# Patient Record
Sex: FEMALE | Race: WHITE | NOT HISPANIC OR LATINO | Employment: UNEMPLOYED | URBAN - METROPOLITAN AREA
[De-identification: names, ages, dates, MRNs, and addresses within clinical notes are randomized per-mention and may not be internally consistent; named-entity substitution may affect disease eponyms.]

---

## 2017-12-02 ENCOUNTER — HOSPITAL ENCOUNTER (EMERGENCY)
Facility: HOSPITAL | Age: 27
Discharge: HOME | End: 2017-12-02
Payer: MEDICAID

## 2017-12-02 VITALS
TEMPERATURE: 99 F | HEART RATE: 111 BPM | HEIGHT: 71 IN | BODY MASS INDEX: 23.1 KG/M2 | DIASTOLIC BLOOD PRESSURE: 65 MMHG | WEIGHT: 165 LBS | OXYGEN SATURATION: 98 % | RESPIRATION RATE: 18 BRPM | SYSTOLIC BLOOD PRESSURE: 148 MMHG

## 2017-12-02 DIAGNOSIS — J32.9 SINUSITIS, UNSPECIFIED CHRONICITY, UNSPECIFIED LOCATION: Primary | ICD-10-CM

## 2017-12-02 LAB — B-HCG UR QL: NEGATIVE

## 2017-12-02 PROCEDURE — 63600175 PHARM REV CODE 636 W HCPCS: Performed by: NURSE PRACTITIONER

## 2017-12-02 PROCEDURE — 81025 URINE PREGNANCY TEST: CPT

## 2017-12-02 PROCEDURE — 99284 EMERGENCY DEPT VISIT MOD MDM: CPT | Mod: 25

## 2017-12-02 PROCEDURE — 96372 THER/PROPH/DIAG INJ SC/IM: CPT

## 2017-12-02 RX ORDER — BENZONATATE 100 MG/1
100 CAPSULE ORAL 3 TIMES DAILY PRN
Qty: 20 CAPSULE | Refills: 0 | Status: SHIPPED | OUTPATIENT
Start: 2017-12-02 | End: 2017-12-12

## 2017-12-02 RX ORDER — KETOROLAC TROMETHAMINE 30 MG/ML
60 INJECTION, SOLUTION INTRAMUSCULAR; INTRAVENOUS
Status: COMPLETED | OUTPATIENT
Start: 2017-12-02 | End: 2017-12-02

## 2017-12-02 RX ORDER — AMOXICILLIN AND CLAVULANATE POTASSIUM 875; 125 MG/1; MG/1
1 TABLET, FILM COATED ORAL 2 TIMES DAILY
Qty: 14 TABLET | Refills: 0 | Status: SHIPPED | OUTPATIENT
Start: 2017-12-02

## 2017-12-02 RX ADMIN — KETOROLAC TROMETHAMINE 60 MG: 30 INJECTION, SOLUTION INTRAMUSCULAR at 04:12

## 2017-12-02 NOTE — ED PROVIDER NOTES
"SCRIBE #1 NOTE: I, Omar Gosselizabeth, am scribing for, and in the presence of,  Praneeth Murphy NP. I have scribed the entire note.      History      Chief Complaint   Patient presents with    Headache     x2mo after stopping meth. Reports cough with blood tinged sputum.        Review of patient's allergies indicates:  No Known Allergies     HPI   HPI    12/2/2017, 3:23 PM   History obtained from the patient      History of Present Illness: Fatoumata Vasquez is a 27 y.o. female patient who presents to the Emergency Department for headache which onset gradually 2 months ago. Symptoms are constant and moderate in severity.  No mitigating or exacerbating factors reported. Pt reports to seeing "white spots when I close my eyes" secondary to the HA. Pt also complains of a productive cough with "bloody" sputum which onset 2 weeks ago and right sided ribcage pain.  Pt reports a Hx of Pneumonia and left sided pleurisy, and states that she "didn't finish all of the required medication because I was on crystal meth." Patient denies any facial pain, focal weakness/ numbness, dizziness, visual disturbance, photophobia, n/v/d, and all other sxs at this time. No further complaints or concerns at this time.         Arrival mode: Personal vehicle    PCP: Provider [facility]       Past Medical History:  Past medical history reviewed not relevant      Past Surgical History:  Past surgical history reviewed not relevant      Family History:  Family history reviewed not relevant      Social History Main Topics    Social History Main Topics    Smoking status: Unknown if ever smoked    Smokeless tobacco: Unknown if ever used    Alcohol Use: Unknown drinking history    Drug Use: Unknown if ever used    Sexual Activity: Unknown       ROS   Review of Systems   Constitutional: Negative for chills and fever.   Eyes: Negative for photophobia and visual disturbance.   Respiratory: Positive for cough. Negative for shortness of breath.  " "  Cardiovascular: Negative for chest pain.   Gastrointestinal: Negative for abdominal pain, diarrhea, nausea and vomiting.   Genitourinary: Negative for difficulty urinating, dysuria, frequency, hematuria and urgency.   Musculoskeletal: Negative for back pain.        + right sided ribcage pain   Skin: Negative for rash.   Neurological: Positive for headaches. Negative for dizziness, syncope, weakness, light-headedness and numbness.   All other systems reviewed and are negative.      Physical Exam      Initial Vitals [12/02/17 1521]   BP Pulse Resp Temp SpO2   (!) 148/65 (!) 111 18 98.7 °F (37.1 °C) 98 %      MAP       92.67          Physical Exam  Nursing Notes and Vital Signs Reviewed.  Constitutional: Patient is in no apparent distress. Well-developed and well-nourished.  Head: Atraumatic. Normocephalic.  Eyes: PERRL. EOM intact. Conjunctivae are not pale. No scleral icterus.  ENT: Mucous membranes are moist. Oropharynx is clear and symmetric. Maxillarty and frontal sinus pressure noted.   Neck: Supple. Full ROM. No lymphadenopathy.  Cardiovascular: Regular rate. Regular rhythm. No murmurs, rubs, or gallops. Distal pulses are 2+ and symmetric.  Pulmonary/Chest: No respiratory distress. Clear to auscultation bilaterally. No wheezing or rales.  Abdominal: Soft and non-distended.  There is no tenderness.  No rebound, guarding, or rigidity. Good bowel sounds.  Musculoskeletal: Moves all extremities. No obvious deformities. No edema. No calf tenderness.  Skin: Warm and dry.  Neurological:  Alert, awake, and appropriate.  Normal speech.  No acute focal neurological deficits are appreciated.  Psychiatric: Normal affect. Good eye contact. Appropriate in content.    ED Course    Procedures  ED Vital Signs:  Vitals:    12/02/17 1521   BP: (!) 148/65   Pulse: (!) 111   Resp: 18   Temp: 98.7 °F (37.1 °C)   TempSrc: Oral   SpO2: 98%   Weight: 74.8 kg (165 lb)   Height: 5' 11" (1.803 m)       Abnormal Lab Results:  Labs " Reviewed   PREGNANCY TEST, URINE RAPID        All Lab Results:  Results for orders placed or performed during the hospital encounter of 12/02/17   Pregnancy, urine rapid (UPT)   Result Value Ref Range    Preg Test, Ur Negative          Imaging Results:  Imaging Results          X-Ray Chest PA And Lateral (Final result)  Result time 12/02/17 16:34:50    Final result by Joanie Ortiz MD (12/02/17 16:34:50)                 Impression:         Negative chest radiograph.            Electronically signed by: JOANIE ORTIZ MD  Date:     12/02/17  Time:    16:34              Narrative:    Exam: XR CHEST PA AND LATERAL    Clinical History:   Chronic cough for 2 month    Findings:   The lungs are clear. The cardiac silhouette is within normal limits.                             CT Head Without Contrast (Final result)  Result time 12/02/17 16:30:42    Final result by Joanie Ortiz MD (12/02/17 16:30:42)                 Impression:             No CT evidence of acute intracranial abnormality.        All CT scans at this facility use dose modulation, iterative reconstruction and/or weight based dosing when appropriate to reduce radiation dose to as low as reasonably achievable.       Electronically signed by: JOANIE ORTIZ MD  Date:     12/02/17  Time:    16:30              Narrative:    Exam: CT HEAD WITHOUT CONTRAST    Clinical History:   Acute head pain.  Initial encounter.headache       Technique: Axial CT imaging was performed through the head without intravenous contrast.     Findings:    No hydrocephalus, midline shift, mass effect, or acute intracranial hemorrhage. Cortical sulcal pattern and gray-white matter differentiation is normal. Basilar cisterns and posterior fossa are normal. The visualized paranasal sinuses and mastoid air cells are clear. The skull is intact.                                      The Emergency Provider reviewed the vital signs and test results, which are outlined above.    ED Discussion     4:41  PM: Discussed with pt all pertinent ED information and results. Discussed pt dx and plan of tx. Gave pt all f/u and return to the ED instructions. All questions and concerns were addressed at this time. Pt expresses understanding of information and instructions, and is comfortable with plan to discharge. Pt is stable for discharge.    Patient's headache is either consistent with previous headache and/or lacks features concerning for emergent or life threatening condition.  I do not suspect SAH, meningitis, increased IC pressure, infectious, toxic, vascular, CNS, or other EMC.  I have discussed this at length with patient and/or family/caretaker.      ED Medication(s):  Medications   ketorolac injection 60 mg (60 mg Intramuscular Given 12/2/17 1416)       New Prescriptions    AMOXICILLIN-CLAVULANATE 875-125MG (AUGMENTIN) 875-125 MG PER TABLET    Take 1 tablet by mouth 2 (two) times daily.    BENZONATATE (TESSALON) 100 MG CAPSULE    Take 1 capsule (100 mg total) by mouth 3 (three) times daily as needed for Cough.       Follow-up Information     [facility] In 2 days.    Contact information:  [address] [facility] [address] [phone]             [facility] - .    Specialty:  Emergency Medicine  Why:  If symptoms worsen  Contact information:  [address] [address] [phone]                   Medical Decision Making    Medical Decision Making:   Clinical Tests:   Lab Tests: Ordered and Reviewed  Radiological Study: Ordered and Reviewed           Scribe Attestation:   Scribe #1: I performed the above scribed service and the documentation accurately describes the services I performed. I attest to the accuracy of the note.    Attending:   Physician Attestation Statement for Scribe #1: I,  Praneeth Murphy NP, personally performed the services described in this documentation, as scribed by Omar Lai, in my presence, and it is both  accurate and complete.          Clinical Impression       ICD-10-CM ICD-9-CM   1. Sinusitis, unspecified chronicity, unspecified location J32.9 473.9       Disposition:   Disposition: Discharged  Condition: Stable         Praneeth Murphy NP  12/03/17 120